# Patient Record
Sex: FEMALE | Race: WHITE | NOT HISPANIC OR LATINO | Employment: OTHER | URBAN - METROPOLITAN AREA
[De-identification: names, ages, dates, MRNs, and addresses within clinical notes are randomized per-mention and may not be internally consistent; named-entity substitution may affect disease eponyms.]

---

## 2019-10-04 ENCOUNTER — TELEPHONE (OUTPATIENT)
Dept: FAMILY MEDICINE | Facility: CLINIC | Age: 34
End: 2019-10-04

## 2022-06-21 PROBLEM — M95.5: Status: ACTIVE | Noted: 2022-06-21

## 2024-02-12 ENCOUNTER — TELEPHONE (OUTPATIENT)
Dept: PHYSICAL MEDICINE AND REHAB | Facility: CLINIC | Age: 39
End: 2024-02-12

## 2024-02-12 NOTE — TELEPHONE ENCOUNTER
Attempted to call back, office was closed. Will try again on Wednesday, tomorrow being a holiday.     PATIENCE Meng     ----- Message from Marcy Gar sent at 2/12/2024  3:19 PM CST -----  Type: Needs Medical Advice  Who Called:  Keren Garcia   Symptoms (please be specific):  said she need a fax number to fax a referral to the office the one w/ have on file for  is not working for her --please call and advise  Best Call Back Number: [phone]  Additional Information: thank you

## 2024-02-19 ENCOUNTER — TELEPHONE (OUTPATIENT)
Dept: NEUROLOGY | Facility: CLINIC | Age: 39
End: 2024-02-19

## 2024-02-19 NOTE — TELEPHONE ENCOUNTER
Pt has a referral for concussion without loss of consciousness, initial encounter.  Please call the pt to schedule.

## 2024-02-28 NOTE — TELEPHONE ENCOUNTER
Spoke with patient and is scheduled in concussion clinic on 3.05 at 10am.    Patient is informed to arrive early due to garage parking and therapy team on site. Patient does not have insurance and will be self pay. Directed her to billing department for out of pocket costs.

## 2024-02-29 DIAGNOSIS — S06.0XAA CONCUSSION WITH UNKNOWN LOSS OF CONSCIOUSNESS STATUS, INITIAL ENCOUNTER: Primary | ICD-10-CM

## 2024-03-02 ENCOUNTER — TELEPHONE (OUTPATIENT)
Dept: NEUROLOGY | Facility: CLINIC | Age: 39
End: 2024-03-02

## 2024-03-02 NOTE — TELEPHONE ENCOUNTER
----- Message from Kwame Flores MD sent at 2/29/2024 12:41 PM CST -----  Understood. I can see her for either concussion clinic or regular slot depending on what her comfort level with cost is. Thanks!  ----- Message -----  From: Rm Delong MA  Sent: 2/29/2024  11:24 AM CST  To: Saira Gomez MA; Kwame Flores MD    Patient does not have workers comp and insurance.  ----- Message -----  From: Kwame Flores MD  Sent: 2/29/2024  11:23 AM CST  To: Rm Delong MA; Saira Gomez MA    Is she workers comp then or using a [employer] insurance plan? I don't mind seeing her but would need to know if needs to go thru [facility] med first please. Thanks!  ----- Message -----  From: Rm Delong MA  Sent: 2/29/2024  11:02 AM CST  To: Saira Gomez MA; Kwame Flores MD    Hey Dr. Flores,  This patient is scheduled with Dr. Walsh next Tuesday but patient is self-pay. Dr. Walsh wants me to cancel patient. Are you okay seeing this patient instead?

## 2024-03-04 ENCOUNTER — TELEPHONE (OUTPATIENT)
Dept: NEUROLOGY | Facility: CLINIC | Age: 39
End: 2024-03-04